# Patient Record
Sex: FEMALE | Race: WHITE | Employment: UNEMPLOYED | URBAN - METROPOLITAN AREA
[De-identification: names, ages, dates, MRNs, and addresses within clinical notes are randomized per-mention and may not be internally consistent; named-entity substitution may affect disease eponyms.]

---

## 2019-02-06 PROCEDURE — 88175 CYTOPATH C/V AUTO FLUID REDO: CPT | Performed by: OBSTETRICS & GYNECOLOGY

## 2019-02-06 PROCEDURE — 87624 HPV HI-RISK TYP POOLED RSLT: CPT | Performed by: OBSTETRICS & GYNECOLOGY

## 2019-06-10 ENCOUNTER — HOSPITAL ENCOUNTER (OUTPATIENT)
Age: 54
Discharge: HOME | End: 2019-06-10
Attending: EMERGENCY MEDICINE
Payer: COMMERCIAL

## 2019-06-10 VITALS
WEIGHT: 119 LBS | SYSTOLIC BLOOD PRESSURE: 133 MMHG | HEART RATE: 65 BPM | TEMPERATURE: 98 F | OXYGEN SATURATION: 99 % | RESPIRATION RATE: 18 BRPM | DIASTOLIC BLOOD PRESSURE: 80 MMHG | HEIGHT: 61 IN | BODY MASS INDEX: 22.47 KG/M2

## 2019-06-10 DIAGNOSIS — Z91.038 ALLERGIC REACTION TO INSECT BITE: Primary | ICD-10-CM

## 2019-06-10 PROCEDURE — 99203 OFFICE O/P NEW LOW 30 MIN: CPT

## 2019-06-10 PROCEDURE — 99213 OFFICE O/P EST LOW 20 MIN: CPT

## 2019-06-10 RX ORDER — PREDNISONE 20 MG/1
40 TABLET ORAL DAILY
Qty: 6 TABLET | Refills: 0 | Status: SHIPPED | OUTPATIENT
Start: 2019-06-10 | End: 2019-06-13

## 2021-10-07 ENCOUNTER — OFFICE VISIT (OUTPATIENT)
Dept: INTERNAL MEDICINE CLINIC | Facility: CLINIC | Age: 56
End: 2021-10-07
Payer: COMMERCIAL

## 2021-10-07 VITALS
SYSTOLIC BLOOD PRESSURE: 100 MMHG | HEIGHT: 63 IN | BODY MASS INDEX: 20.37 KG/M2 | HEART RATE: 78 BPM | WEIGHT: 115 LBS | OXYGEN SATURATION: 98 % | DIASTOLIC BLOOD PRESSURE: 60 MMHG

## 2021-10-07 DIAGNOSIS — M19.90 ARTHRITIS: ICD-10-CM

## 2021-10-07 DIAGNOSIS — Z00.00 WELLNESS EXAMINATION: Primary | ICD-10-CM

## 2021-10-07 DIAGNOSIS — L65.9 HAIR THINNING: ICD-10-CM

## 2021-10-07 DIAGNOSIS — R10.11 RIGHT UPPER QUADRANT PAIN: ICD-10-CM

## 2021-10-07 PROCEDURE — 3008F BODY MASS INDEX DOCD: CPT | Performed by: INTERNAL MEDICINE

## 2021-10-07 PROCEDURE — 99386 PREV VISIT NEW AGE 40-64: CPT | Performed by: INTERNAL MEDICINE

## 2021-10-07 PROCEDURE — 3074F SYST BP LT 130 MM HG: CPT | Performed by: INTERNAL MEDICINE

## 2021-10-07 PROCEDURE — 3078F DIAST BP <80 MM HG: CPT | Performed by: INTERNAL MEDICINE

## 2021-10-07 RX ORDER — IBUPROFEN 200 MG
TABLET ORAL
COMMUNITY

## 2021-11-01 ENCOUNTER — TELEPHONE (OUTPATIENT)
Dept: INTERNAL MEDICINE CLINIC | Facility: CLINIC | Age: 56
End: 2021-11-01

## 2022-03-08 ENCOUNTER — OFFICE VISIT (OUTPATIENT)
Dept: ALLERGY | Facility: CLINIC | Age: 57
End: 2022-03-08
Payer: COMMERCIAL

## 2022-03-08 ENCOUNTER — LAB ENCOUNTER (OUTPATIENT)
Dept: LAB | Age: 57
End: 2022-03-08
Attending: ALLERGY & IMMUNOLOGY
Payer: COMMERCIAL

## 2022-03-08 VITALS
SYSTOLIC BLOOD PRESSURE: 115 MMHG | BODY MASS INDEX: 20.37 KG/M2 | DIASTOLIC BLOOD PRESSURE: 71 MMHG | WEIGHT: 115 LBS | OXYGEN SATURATION: 97 % | HEIGHT: 63 IN | HEART RATE: 68 BPM

## 2022-03-08 DIAGNOSIS — R76.8 LOW SERUM IGA FOR AGE: Primary | ICD-10-CM

## 2022-03-08 DIAGNOSIS — M25.541 ARTHRALGIA OF BOTH HANDS: ICD-10-CM

## 2022-03-08 DIAGNOSIS — Z92.29 COVID-19 VACCINE SERIES COMPLETED: ICD-10-CM

## 2022-03-08 DIAGNOSIS — M25.542 ARTHRALGIA OF BOTH HANDS: ICD-10-CM

## 2022-03-08 LAB
ALBUMIN SERPL-MCNC: 4.3 G/DL (ref 3.4–5)
ALBUMIN/GLOB SERPL: 1.4 {RATIO} (ref 1–2)
ALP LIVER SERPL-CCNC: 101 U/L
ALT SERPL-CCNC: 24 U/L
ANION GAP SERPL CALC-SCNC: 3 MMOL/L (ref 0–18)
AST SERPL-CCNC: 18 U/L (ref 15–37)
BILIRUB SERPL-MCNC: 0.2 MG/DL (ref 0.1–2)
BUN BLD-MCNC: 17 MG/DL (ref 7–18)
BUN/CREAT SERPL: 23.9 (ref 10–20)
CALCIUM BLD-MCNC: 9.7 MG/DL (ref 8.5–10.1)
CHLORIDE SERPL-SCNC: 108 MMOL/L (ref 98–112)
CO2 SERPL-SCNC: 27 MMOL/L (ref 21–32)
CREAT BLD-MCNC: 0.71 MG/DL
FASTING STATUS PATIENT QL REPORTED: NO
GLOBULIN PLAS-MCNC: 3 G/DL (ref 2.8–4.4)
GLUCOSE BLD-MCNC: 108 MG/DL (ref 70–99)
OSMOLALITY SERPL CALC.SUM OF ELEC: 288 MOSM/KG (ref 275–295)
POTASSIUM SERPL-SCNC: 4.6 MMOL/L (ref 3.5–5.1)
PROT SERPL-MCNC: 7.3 G/DL (ref 6.4–8.2)
SODIUM SERPL-SCNC: 138 MMOL/L (ref 136–145)

## 2022-03-08 PROCEDURE — 82784 ASSAY IGA/IGD/IGG/IGM EACH: CPT | Performed by: ALLERGY & IMMUNOLOGY

## 2022-03-08 PROCEDURE — 3008F BODY MASS INDEX DOCD: CPT | Performed by: ALLERGY & IMMUNOLOGY

## 2022-03-08 PROCEDURE — 99204 OFFICE O/P NEW MOD 45 MIN: CPT | Performed by: ALLERGY & IMMUNOLOGY

## 2022-03-08 PROCEDURE — 80053 COMPREHEN METABOLIC PANEL: CPT | Performed by: ALLERGY & IMMUNOLOGY

## 2022-03-08 PROCEDURE — 36415 COLL VENOUS BLD VENIPUNCTURE: CPT | Performed by: ALLERGY & IMMUNOLOGY

## 2022-03-08 PROCEDURE — 3078F DIAST BP <80 MM HG: CPT | Performed by: ALLERGY & IMMUNOLOGY

## 2022-03-08 PROCEDURE — 3074F SYST BP LT 130 MM HG: CPT | Performed by: ALLERGY & IMMUNOLOGY

## 2022-03-08 RX ORDER — RANITIDINE 150 MG/1
1 TABLET ORAL DAILY
COMMUNITY
End: 2022-03-28 | Stop reason: ALTCHOICE

## 2022-03-08 RX ORDER — PHENOBARBITAL
POWDER (GRAM) MISCELLANEOUS AS DIRECTED
COMMUNITY
End: 2022-03-28 | Stop reason: ALTCHOICE

## 2022-03-08 NOTE — PATIENT INSTRUCTIONS
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 I do not suspect selective IgA deficiency is the case is typically present with an IgA level less than 5   Reviewed a low IgA may potentially predispose people to autoimmune diseases but I do not have a way of replacing IgA  And if an autoimmune disease was found and would be managed by the rheumatologist  Recommend rechecking IgA as well as IgG and IgM  Check CMP including total protein albumin and globulin    #2 arthralgia of the hands  Patient to be seen by second rheumatologist for second opinion later this month. Continue with NSAIDs for pain relief including Motrin or Aleve    #3 recommend COVID vaccination    #4 history of elevated white blood cell count  Patient is a chronic smoker which can elevate white blood cell counts  Prior hematology per daughter's report. No prior bone marrow biopsy  May follow-up with hematology of white blood cell count remains elevated.   Recommend working on smoking cessation    Thanks for appointment

## 2022-03-09 ENCOUNTER — TELEPHONE (OUTPATIENT)
Dept: ALLERGY | Facility: CLINIC | Age: 57
End: 2022-03-09

## 2022-03-09 LAB
IGA SERPL-MCNC: 62.1 MG/DL (ref 70–312)
IGM SERPL-MCNC: 115 MG/DL (ref 43–279)
IMMUNOGLOBULIN PNL SER-MCNC: 908 MG/DL (ref 791–1643)

## 2022-03-09 NOTE — TELEPHONE ENCOUNTER
CMP shows a mildly elevated glucose as this was nonfasting which is normal.  Total protein albumin and globulins were normal which is good news.

## 2022-03-09 NOTE — TELEPHONE ENCOUNTER
----- Message from William Medina MD sent at 3/9/2022  7:43 AM CST -----  Please call patient with quantitative immunoglobulins. Her IgG and IgM were normal.  Her IgA remains mildly low at 62.1. I am glad to see her IgG and IgM were normal.  This is good news.

## 2022-03-09 NOTE — TELEPHONE ENCOUNTER
Pt daughter contacted, confirmed name, and . Pt daughter verbalizes understanding, and denies further questions. Pt daughter on MICHAEL.

## 2022-03-28 ENCOUNTER — OFFICE VISIT (OUTPATIENT)
Dept: INTERNAL MEDICINE CLINIC | Facility: CLINIC | Age: 57
End: 2022-03-28
Payer: COMMERCIAL

## 2022-03-28 VITALS
TEMPERATURE: 98 F | HEART RATE: 69 BPM | DIASTOLIC BLOOD PRESSURE: 68 MMHG | OXYGEN SATURATION: 98 % | SYSTOLIC BLOOD PRESSURE: 102 MMHG

## 2022-03-28 DIAGNOSIS — J02.9 PHARYNGITIS, UNSPECIFIED ETIOLOGY: Primary | ICD-10-CM

## 2022-03-28 LAB
CONTROL LINE PRESENT WITH A CLEAR BACKGROUND (YES/NO): YES YES/NO
KIT LOT #: NORMAL NUMERIC
STREP GRP A CUL-SCR: NEGATIVE

## 2022-03-28 PROCEDURE — 3078F DIAST BP <80 MM HG: CPT | Performed by: FAMILY MEDICINE

## 2022-03-28 PROCEDURE — 87880 STREP A ASSAY W/OPTIC: CPT | Performed by: FAMILY MEDICINE

## 2022-03-28 PROCEDURE — 87081 CULTURE SCREEN ONLY: CPT | Performed by: FAMILY MEDICINE

## 2022-03-28 PROCEDURE — 3074F SYST BP LT 130 MM HG: CPT | Performed by: FAMILY MEDICINE

## 2022-03-28 PROCEDURE — 99213 OFFICE O/P EST LOW 20 MIN: CPT | Performed by: FAMILY MEDICINE

## 2022-03-28 NOTE — ASSESSMENT & PLAN NOTE
Pharyngitis, suspect viral etiology. Rapid strep negative. Strep culture. Stay well-hydrated. Warm water, honey. Take Tylenol or ibuprofen as needed for pain. Lozenges as needed. Get Covid test.  Follow-up as needed.

## 2023-10-26 ENCOUNTER — OFFICE VISIT (OUTPATIENT)
Dept: INTERNAL MEDICINE CLINIC | Facility: CLINIC | Age: 58
End: 2023-10-26

## 2023-10-26 VITALS
DIASTOLIC BLOOD PRESSURE: 67 MMHG | HEIGHT: 63 IN | HEART RATE: 76 BPM | SYSTOLIC BLOOD PRESSURE: 110 MMHG | BODY MASS INDEX: 20.59 KG/M2 | WEIGHT: 116.19 LBS | RESPIRATION RATE: 16 BRPM

## 2023-10-26 DIAGNOSIS — E55.9 VITAMIN D DEFICIENCY: ICD-10-CM

## 2023-10-26 DIAGNOSIS — F17.200 TOBACCO DEPENDENCE: ICD-10-CM

## 2023-10-26 DIAGNOSIS — R56.9 SEIZURES (HCC): ICD-10-CM

## 2023-10-26 DIAGNOSIS — Z00.00 PHYSICAL EXAM, ANNUAL: Primary | ICD-10-CM

## 2023-10-26 DIAGNOSIS — E07.9 SWELLING OF THYROID GLAND: ICD-10-CM

## 2023-10-26 DIAGNOSIS — Z12.31 SCREENING MAMMOGRAM, ENCOUNTER FOR: ICD-10-CM

## 2023-10-26 DIAGNOSIS — L60.3 BRITTLE NAILS: ICD-10-CM

## 2023-10-26 PROCEDURE — 3074F SYST BP LT 130 MM HG: CPT | Performed by: INTERNAL MEDICINE

## 2023-10-26 PROCEDURE — 3078F DIAST BP <80 MM HG: CPT | Performed by: INTERNAL MEDICINE

## 2023-10-26 PROCEDURE — 3008F BODY MASS INDEX DOCD: CPT | Performed by: INTERNAL MEDICINE

## 2023-10-26 PROCEDURE — 99213 OFFICE O/P EST LOW 20 MIN: CPT | Performed by: INTERNAL MEDICINE

## 2023-11-17 ENCOUNTER — HOSPITAL ENCOUNTER (OUTPATIENT)
Dept: ULTRASOUND IMAGING | Age: 58
Discharge: HOME | End: 2023-11-17
Attending: INTERNAL MEDICINE
Payer: COMMERCIAL

## 2023-11-17 DIAGNOSIS — E07.9 SWELLING OF THYROID GLAND: ICD-10-CM

## 2023-11-17 PROCEDURE — 76536 US EXAM OF HEAD AND NECK: CPT | Performed by: INTERNAL MEDICINE

## 2024-10-31 ENCOUNTER — OFFICE VISIT (OUTPATIENT)
Dept: INTERNAL MEDICINE CLINIC | Facility: CLINIC | Age: 59
End: 2024-10-31

## 2024-10-31 VITALS
SYSTOLIC BLOOD PRESSURE: 103 MMHG | WEIGHT: 115.63 LBS | BODY MASS INDEX: 20.49 KG/M2 | HEART RATE: 77 BPM | DIASTOLIC BLOOD PRESSURE: 65 MMHG | HEIGHT: 63 IN

## 2024-10-31 DIAGNOSIS — N95.1 VAGINAL DRYNESS, MENOPAUSAL: ICD-10-CM

## 2024-10-31 DIAGNOSIS — E55.9 VITAMIN D DEFICIENCY: ICD-10-CM

## 2024-10-31 DIAGNOSIS — N60.11 DIFFUSE CYSTIC MASTOPATHY OF BOTH BREASTS: ICD-10-CM

## 2024-10-31 DIAGNOSIS — Z00.00 PHYSICAL EXAM, ANNUAL: Primary | ICD-10-CM

## 2024-10-31 DIAGNOSIS — F17.200 TOBACCO DEPENDENCE: ICD-10-CM

## 2024-10-31 DIAGNOSIS — N64.59 INVERTED NIPPLE: ICD-10-CM

## 2024-10-31 DIAGNOSIS — N60.12 DIFFUSE CYSTIC MASTOPATHY OF BOTH BREASTS: ICD-10-CM

## 2024-10-31 DIAGNOSIS — Z12.11 COLON CANCER SCREENING: ICD-10-CM

## 2024-10-31 DIAGNOSIS — R92.8 ABNORMAL MAMMOGRAM: ICD-10-CM

## 2024-10-31 DIAGNOSIS — Z12.31 SCREENING MAMMOGRAM, ENCOUNTER FOR: ICD-10-CM

## 2024-10-31 NOTE — PROGRESS NOTES
Nora Young is a 59 year old female.  Chief Complaint   Patient presents with    Physical    Vaginal Problem     Itch - after using a public restroom x 1 week        HPI:   Pt comes for her annual physical  C/ c annual physical   C/o sees neuro - dr reyes   Cystic breasts and small , very     Still smoking - half a pack a day x 40 yrs       HISTORY  10/2023  New patient- here with her daughter Angela  C/C establish care-used to see Dr. 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         Past Medical History:    Anemia    History of breast problem    Seizure (HCC)    Viral encephalitis (HCC)      Past Surgical History:   Procedure Laterality Date    Brain surgery  1995    D & c      Other surgical history      breast aspirations and biopsies for cyctic breasts      Social History:  Social History     Socioeconomic History    Marital status:    Tobacco Use    Smoking status: Every Day     Current packs/day: 0.50     Average packs/day: 0.5 packs/day for 43.6 years (21.8 ttl pk-yrs)     Types: Cigarettes     Start date: 3/28/1981     Passive exposure: Never    Smokeless tobacco: Never   Vaping Use    Vaping status: Never Used   Substance and Sexual Activity    Alcohol use: No    Drug use: No        REVIEW OF SYSTEMS:   GENERAL HEALTH: No fevers, chills, sweats, fatigue  VISION: No recent vision problems,  blurry vision or double vision  HEENT: No decreased hearing + chronic right ear pain- comes and goes , no  nasal congestion or sore throat  SKIN: denies any unusual skin lesions or rashes  RESPIRATORY: denies shortness of breath, cough, wheezing  CARDIOVASCULAR: denies chest pain on exertion, palpitations, swelling in feet  GI: denies abdominal pain and denies heartburn, nausea or vomiting  : No Pain on urination, change in the color of urine, discharge, urinating frequently  MUS: No back pain, joint pain, muscle pain  NEURO: denies headaches , anxiety, depression    EXAM:   /65   Pulse 77   Ht 5' 3\" (1.6 m)   Wt 115 lb 9.6 oz (52.4 kg)   BMI 20.48 kg/m²   GENERAL: well developed, well nourished,in no apparent distress  SKIN: no rashes,no suspicious lesions  HEENT: atraumatic, normocephalic,ears and throat are clear, no frontal or maxillary sinus tenderness, pupils equal reactive to light bilaterally, extraocular muscles intact  NECK: supple,no adenopathy, nontender   LUNGS: clear to auscultation, no wheeze  CARDIO: RRR without murmur  GI: good BS's,no masses or tenderness  EXTREMITIES: no cyanosis, or edema  BREAST: Bilateral breasts without any lumps, bilateral axilla without any lymphadenopathy, + b/l inverted nipples no   discharge      ASSESSMENT AND PLAN:   Diagnoses and all orders for this visit:    Physical exam, annual  -     [facility] MEGHAN 2D+3D SCREENING BILAT (CPT=77067/55822); Future  -     Comp Metabolic Panel (14); Future  -     Lipid Panel; Future  -     TSH W Reflex To Free T4; Future  -     Vitamin D; Future  -     CBC, Platelet; No Differential; Future  -     Free T3 (Triiodothryronine); Future  -     Vitamin B12; Future  -     Folic Acid Serum (Folate); Future   advised patient to watch what she eats and  exercise, seatbelt use no texting driving, sunscreen use advised  Screening mammogram, encounter for  -     [facility] MEGHAN 2D+3D SCREENING BILAT (CPT=77067/79596); Future  -     US BREAST  BILATERAL COMPLETE (CPT=76641-50); Future  Patient does not like to get mammograms because she does not have enough tissue and they pressed down too hard to the point where there may be do some discharge and it is very painful and always comes back is abnormal, has history of biopsy  Vitamin D deficiency  -     Vitamin D; Future  Recheck  Colon cancer screening  -     [facility] GI Telephone Colon Screen  -     Gastro Referral - In Network  Refer  Tobacco dependence  -     CT LUNG LD SCREENING(CPT=71271); Future  Advised to quit    Abnormal mammogram  -     OP REFERRAL TO SURGICAL ONCOLOGY  And   Cystic breasts  And  Inverted nipple  -     OP REFERRAL TO SURGICAL ONCOLOGY  -     OBG - INTERNAL  Will refer for an evaluation  Vaginal dryness, menopausal  -     OBG - INTERNAL  Refer to gyn, not interested in Estrace cream          Preventative medicine  Mammogram- 11/2022 , ordered   Colonoscopy usman morgan at [facility]   Pap smear 11/2022 dr clyde roberson       The patient indicates understanding of these issues and agrees to the plan.  No follow-ups on file.

## 2024-11-04 ENCOUNTER — LAB ENCOUNTER (OUTPATIENT)
Dept: LAB | Age: 59
End: 2024-11-04
Attending: INTERNAL MEDICINE
Payer: COMMERCIAL

## 2024-11-04 DIAGNOSIS — E55.9 VITAMIN D DEFICIENCY: ICD-10-CM

## 2024-11-04 DIAGNOSIS — Z00.00 PHYSICAL EXAM, ANNUAL: ICD-10-CM

## 2024-11-04 LAB
ALBUMIN SERPL-MCNC: 4.3 G/DL (ref 3.2–4.8)
ALBUMIN/GLOB SERPL: 1.7 {RATIO} (ref 1–2)
ALP LIVER SERPL-CCNC: 89 U/L
ALT SERPL-CCNC: 11 U/L
ANION GAP SERPL CALC-SCNC: 3 MMOL/L (ref 0–18)
AST SERPL-CCNC: 14 U/L (ref ?–34)
BILIRUB SERPL-MCNC: 0.4 MG/DL (ref 0.2–1.1)
BUN BLD-MCNC: 21 MG/DL (ref 9–23)
BUN/CREAT SERPL: 28 (ref 10–20)
CALCIUM BLD-MCNC: 9.7 MG/DL (ref 8.7–10.4)
CHLORIDE SERPL-SCNC: 111 MMOL/L (ref 98–112)
CHOLEST SERPL-MCNC: 307 MG/DL (ref ?–200)
CO2 SERPL-SCNC: 27 MMOL/L (ref 21–32)
CREAT BLD-MCNC: 0.75 MG/DL
DEPRECATED RDW RBC AUTO: 41.1 FL (ref 35.1–46.3)
EGFRCR SERPLBLD CKD-EPI 2021: 91 ML/MIN/1.73M2 (ref 60–?)
ERYTHROCYTE [DISTWIDTH] IN BLOOD BY AUTOMATED COUNT: 12 % (ref 11–15)
FASTING PATIENT LIPID ANSWER: YES
FASTING STATUS PATIENT QL REPORTED: YES
FOLATE SERPL-MCNC: 13.2 NG/ML (ref 5.4–?)
GLOBULIN PLAS-MCNC: 2.6 G/DL (ref 2–3.5)
GLUCOSE BLD-MCNC: 97 MG/DL (ref 70–99)
HCT VFR BLD AUTO: 36.6 %
HDLC SERPL-MCNC: 78 MG/DL (ref 40–59)
HGB BLD-MCNC: 12.5 G/DL
LDLC SERPL CALC-MCNC: 211 MG/DL (ref ?–100)
MCH RBC QN AUTO: 31.6 PG (ref 26–34)
MCHC RBC AUTO-ENTMCNC: 34.2 G/DL (ref 31–37)
MCV RBC AUTO: 92.7 FL
NONHDLC SERPL-MCNC: 229 MG/DL (ref ?–130)
OSMOLALITY SERPL CALC.SUM OF ELEC: 295 MOSM/KG (ref 275–295)
PLATELET # BLD AUTO: 224 10(3)UL (ref 150–450)
POTASSIUM SERPL-SCNC: 4.5 MMOL/L (ref 3.5–5.1)
PROT SERPL-MCNC: 6.9 G/DL (ref 5.7–8.2)
RBC # BLD AUTO: 3.95 X10(6)UL
SODIUM SERPL-SCNC: 141 MMOL/L (ref 136–145)
T3FREE SERPL-MCNC: 2.88 PG/ML (ref 2.4–4.2)
TRIGL SERPL-MCNC: 108 MG/DL (ref 30–149)
TSI SER-ACNC: 2.37 UIU/ML (ref 0.55–4.78)
VIT B12 SERPL-MCNC: 1009 PG/ML (ref 211–911)
VIT D+METAB SERPL-MCNC: 31.1 NG/ML (ref 30–100)
VLDLC SERPL CALC-MCNC: 23 MG/DL (ref 0–30)
WBC # BLD AUTO: 9 X10(3) UL (ref 4–11)

## 2024-11-04 PROCEDURE — 36415 COLL VENOUS BLD VENIPUNCTURE: CPT

## 2024-11-04 PROCEDURE — 82746 ASSAY OF FOLIC ACID SERUM: CPT

## 2024-11-04 PROCEDURE — 80061 LIPID PANEL: CPT

## 2024-11-04 PROCEDURE — 80053 COMPREHEN METABOLIC PANEL: CPT

## 2024-11-04 PROCEDURE — 84481 FREE ASSAY (FT-3): CPT

## 2024-11-04 PROCEDURE — 84443 ASSAY THYROID STIM HORMONE: CPT

## 2024-11-04 PROCEDURE — 82607 VITAMIN B-12: CPT

## 2024-11-04 PROCEDURE — 85027 COMPLETE CBC AUTOMATED: CPT

## 2024-11-04 PROCEDURE — 82306 VITAMIN D 25 HYDROXY: CPT
